# Patient Record
Sex: MALE | Race: OTHER | HISPANIC OR LATINO
[De-identification: names, ages, dates, MRNs, and addresses within clinical notes are randomized per-mention and may not be internally consistent; named-entity substitution may affect disease eponyms.]

---

## 2019-02-25 ENCOUNTER — APPOINTMENT (OUTPATIENT)
Dept: ENDOCRINOLOGY | Facility: CLINIC | Age: 49
End: 2019-02-25
Payer: MEDICAID

## 2019-02-25 VITALS
SYSTOLIC BLOOD PRESSURE: 140 MMHG | DIASTOLIC BLOOD PRESSURE: 80 MMHG | HEIGHT: 65 IN | WEIGHT: 150 LBS | OXYGEN SATURATION: 97 % | HEART RATE: 88 BPM | RESPIRATION RATE: 16 BRPM | BODY MASS INDEX: 24.99 KG/M2

## 2019-02-25 DIAGNOSIS — Z83.49 FAMILY HISTORY OF OTHER ENDOCRINE, NUTRITIONAL AND METABOLIC DISEASES: ICD-10-CM

## 2019-02-25 DIAGNOSIS — I10 ESSENTIAL (PRIMARY) HYPERTENSION: ICD-10-CM

## 2019-02-25 DIAGNOSIS — E04.9 NONTOXIC GOITER, UNSPECIFIED: ICD-10-CM

## 2019-02-25 PROBLEM — Z00.00 ENCOUNTER FOR PREVENTIVE HEALTH EXAMINATION: Status: ACTIVE | Noted: 2019-02-25

## 2019-02-25 PROCEDURE — 99244 OFF/OP CNSLTJ NEW/EST MOD 40: CPT

## 2019-02-25 RX ORDER — AMLODIPINE BESYLATE 10 MG/1
10 TABLET ORAL
Refills: 0 | Status: ACTIVE | COMMUNITY

## 2019-02-25 RX ORDER — FAMOTIDINE 10 MG/1
10 TABLET, FILM COATED ORAL
Refills: 0 | Status: ACTIVE | COMMUNITY

## 2019-02-25 RX ORDER — BENAZEPRIL HYDROCHLORIDE AND HYDROCHLOROTHIAZIDE 10; 12.5 MG/1; MG/1
10-12.5 TABLET, FILM COATED ORAL
Refills: 0 | Status: DISCONTINUED | COMMUNITY
End: 2019-02-25

## 2019-02-25 RX ORDER — BENAZEPRIL HYDROCHLORIDE 20 MG/1
20 TABLET, FILM COATED ORAL DAILY
Qty: 30 | Refills: 5 | Status: ACTIVE | COMMUNITY
Start: 2019-02-25 | End: 1900-01-01

## 2019-02-25 NOTE — HISTORY OF PRESENT ILLNESS
[FreeTextEntry1] : 48 year [place]ib[place]ian-speaking male  referred for hypercalcemia. \par Came with his daughter who helps interpreting.\par Apparently diagnosed with elevated calcium about 2 months ago on several occasion. There are no labs available to me. \par Cancer history: none\par Calcium supplementation: none\par Fracture history: none\par Bone disease risk factors: no history of liver disease, kidney disease, thyroid disease, anticonvulsant use, glucocorticoid use, autoimmune disorders such as rheumatoid arthritis and SLE, COPD, IBD, known malabsorption disorders, or weight loss. \par Family history is negative for  nephrolithiasis, pancreatitis and tumors. Mother was diagnosed with hypercalcemia. \par Currently, no symptoms of polyuria, polydipsia, constipation, abdominal pain, mental confusion, depression, bone pain or fractures,kidney stones. \par Lab:  none\par Radiology: none\par \par

## 2019-02-25 NOTE — CONSULT LETTER
[Dear  ___] : Dear  [unfilled], [Sincerely,] : Sincerely, [DrGraciela  ___] : Dr. JOHNSON [FreeTextEntry1] : Thank you for referring  Mr. FRANCISCO JAVIER CAMPO to me for evaluation and treatment. Please, see attached consultation note. As always, if there are specific questions you would like to discuss, please feel free to contact me.\par Thank you for the courtesy of this evaluation.\par  [FreeTextEntry3] : Austin Yang MD, FACE, ECNU\par

## 2019-02-25 NOTE — ASSESSMENT
[FreeTextEntry1] : - Obtain reports from PCP with prior calcium results.\par - d/c benazepril/HCTZ and switch to benazepril 20mg qd. \par - repeat calcium/PTH, 25D, ionized calcium in about 1 month\par - once D-repleted, will collect urine for 24h calcium\par - thyroid US\par - if indicated , will refer for parathyroid scan\par - hydration\par RTC 5-6 weeks, or sooner prn

## 2019-04-03 ENCOUNTER — APPOINTMENT (OUTPATIENT)
Dept: ENDOCRINOLOGY | Facility: CLINIC | Age: 49
End: 2019-04-03
Payer: MEDICAID

## 2019-04-03 VITALS
HEIGHT: 65 IN | RESPIRATION RATE: 16 BRPM | OXYGEN SATURATION: 97 % | WEIGHT: 150 LBS | BODY MASS INDEX: 24.99 KG/M2 | HEART RATE: 88 BPM | SYSTOLIC BLOOD PRESSURE: 130 MMHG | DIASTOLIC BLOOD PRESSURE: 70 MMHG

## 2019-04-03 PROCEDURE — 99213 OFFICE O/P EST LOW 20 MIN: CPT

## 2019-04-03 NOTE — HISTORY OF PRESENT ILLNESS
[FreeTextEntry1] : 48 year Filipino-speaking male  f/u for hypercalcemia. \par \par off HCTZ. taking benazepril\par repeat ca- 10.3, PTH- 44, 25D- 14\par TSH-  0.91\par did not do thyr US yet\par \par \par HPI:\par Apparently diagnosed with elevated calcium about 2 months ago on several occasion. There are no labs available to me. \par Cancer history: none\par Calcium supplementation: none\par Fracture history: none\par Bone disease risk factors: no history of liver disease, kidney disease, thyroid disease, anticonvulsant use, glucocorticoid use, autoimmune disorders such as rheumatoid arthritis and SLE, COPD, IBD, known malabsorption disorders, or weight loss. \par Family history is negative for  nephrolithiasis, pancreatitis and tumors. Mother was diagnosed with hypercalcemia. \par Currently, no symptoms of polyuria, polydipsia, constipation, abdominal pain, mental confusion, depression, bone pain or fractures,kidney stones. \par Radiology: none\par \par

## 2019-04-03 NOTE — ASSESSMENT
[FreeTextEntry1] : - still obtain reports from PCP with prior calcium results.\par - cont benazepril 20mg qd. Keep off HCTZ if able\par - drisdol 50K qw\par - once D-repleted, will collect urine for 24h calcium\par - thyroid US\par - if indicated , will refer for parathyroid scan\par - hydration\par RTC 3 mos, or sooner prn

## 2019-07-12 ENCOUNTER — APPOINTMENT (OUTPATIENT)
Dept: FAMILY MEDICINE | Facility: CLINIC | Age: 49
End: 2019-07-12
Payer: MEDICAID

## 2019-07-12 VITALS
OXYGEN SATURATION: 97 % | BODY MASS INDEX: 24.99 KG/M2 | RESPIRATION RATE: 16 BRPM | HEART RATE: 77 BPM | SYSTOLIC BLOOD PRESSURE: 124 MMHG | HEIGHT: 65 IN | DIASTOLIC BLOOD PRESSURE: 60 MMHG | WEIGHT: 150 LBS

## 2019-07-12 DIAGNOSIS — I10 ESSENTIAL (PRIMARY) HYPERTENSION: ICD-10-CM

## 2019-07-12 DIAGNOSIS — E83.52 HYPERCALCEMIA: ICD-10-CM

## 2019-07-12 DIAGNOSIS — M54.5 LOW BACK PAIN: ICD-10-CM

## 2019-07-12 DIAGNOSIS — E55.9 VITAMIN D DEFICIENCY, UNSPECIFIED: ICD-10-CM

## 2019-07-12 PROCEDURE — 99203 OFFICE O/P NEW LOW 30 MIN: CPT

## 2019-07-12 RX ORDER — ERGOCALCIFEROL 1.25 MG/1
1.25 MG CAPSULE ORAL
Qty: 13 | Refills: 1 | Status: DISCONTINUED | COMMUNITY
Start: 2019-04-03 | End: 2019-07-12

## 2019-07-12 RX ORDER — VITAMIN K2 90 MCG
125 MCG CAPSULE ORAL
Qty: 60 | Refills: 2 | Status: ACTIVE | COMMUNITY
Start: 2019-07-12 | End: 1900-01-01

## 2019-07-12 NOTE — DATA REVIEWED
[FreeTextEntry1] : Reviewed bw and ua results\par Agreed to reduce vit d to 5000 iu qd from 50k qwk (pt states started urine collection after taking vit d 50k)

## 2019-07-12 NOTE — HISTORY OF PRESENT ILLNESS
[Parent] : parent [FreeTextEntry8] : first time here\par hx of high ca+, had repeat bw and urine collection, wants to review\par also c/o occasional lbp, unable to take nsaids and asa due to angioedema, works in maintenance\par told to have pinched nerve, denies herniated discs\par Beninese speaking

## 2019-07-12 NOTE — PHYSICAL EXAM
[Normal] : normal gait, coordination grossly intact, no focal deficits and deep tendon reflexes were 2+ and symmetric [Normal Affect] : the affect was normal [Alert and Oriented x3] : oriented to person, place, and time [Normal Insight/Judgement] : insight and judgment were intact [No JVD] : no jugular venous distention [Normal Sclera/Conjunctiva] : normal sclera/conjunctiva [Normal Outer Ear/Nose] : the outer ears and nose were normal in appearance [No Respiratory Distress] : no respiratory distress  [No Accessory Muscle Use] : no accessory muscle use [de-identified] : + lumbar tenderness

## 2021-05-12 ENCOUNTER — OUTPATIENT (OUTPATIENT)
Dept: OUTPATIENT SERVICES | Facility: HOSPITAL | Age: 51
LOS: 1 days | End: 2021-05-12
Payer: MEDICAID

## 2021-05-12 VITALS
TEMPERATURE: 99 F | DIASTOLIC BLOOD PRESSURE: 80 MMHG | HEART RATE: 81 BPM | RESPIRATION RATE: 20 BRPM | SYSTOLIC BLOOD PRESSURE: 140 MMHG | HEIGHT: 64.5 IN | WEIGHT: 151.9 LBS | OXYGEN SATURATION: 98 %

## 2021-05-12 DIAGNOSIS — I10 ESSENTIAL (PRIMARY) HYPERTENSION: ICD-10-CM

## 2021-05-12 DIAGNOSIS — G47.33 OBSTRUCTIVE SLEEP APNEA (ADULT) (PEDIATRIC): ICD-10-CM

## 2021-05-12 DIAGNOSIS — D16.5 BENIGN NEOPLASM OF LOWER JAW BONE: ICD-10-CM

## 2021-05-12 DIAGNOSIS — T78.40XS ALLERGY, UNSPECIFIED, SEQUELA: ICD-10-CM

## 2021-05-12 DIAGNOSIS — Z98.890 OTHER SPECIFIED POSTPROCEDURAL STATES: Chronic | ICD-10-CM

## 2021-05-12 LAB
ANION GAP SERPL CALC-SCNC: 12 MMOL/L — SIGNIFICANT CHANGE UP (ref 7–14)
BLD GP AB SCN SERPL QL: NEGATIVE — SIGNIFICANT CHANGE UP
BUN SERPL-MCNC: 14 MG/DL — SIGNIFICANT CHANGE UP (ref 7–23)
CALCIUM SERPL-MCNC: 9.5 MG/DL — SIGNIFICANT CHANGE UP (ref 8.4–10.5)
CHLORIDE SERPL-SCNC: 102 MMOL/L — SIGNIFICANT CHANGE UP (ref 98–107)
CO2 SERPL-SCNC: 25 MMOL/L — SIGNIFICANT CHANGE UP (ref 22–31)
CREAT SERPL-MCNC: 0.83 MG/DL — SIGNIFICANT CHANGE UP (ref 0.5–1.3)
GLUCOSE SERPL-MCNC: 112 MG/DL — HIGH (ref 70–99)
HCT VFR BLD CALC: 44.8 % — SIGNIFICANT CHANGE UP (ref 39–50)
HGB BLD-MCNC: 15.1 G/DL — SIGNIFICANT CHANGE UP (ref 13–17)
MCHC RBC-ENTMCNC: 28.7 PG — SIGNIFICANT CHANGE UP (ref 27–34)
MCHC RBC-ENTMCNC: 33.7 GM/DL — SIGNIFICANT CHANGE UP (ref 32–36)
MCV RBC AUTO: 85 FL — SIGNIFICANT CHANGE UP (ref 80–100)
NRBC # BLD: 0 /100 WBCS — SIGNIFICANT CHANGE UP
NRBC # FLD: 0 K/UL — SIGNIFICANT CHANGE UP
PLATELET # BLD AUTO: 216 K/UL — SIGNIFICANT CHANGE UP (ref 150–400)
POTASSIUM SERPL-MCNC: 4 MMOL/L — SIGNIFICANT CHANGE UP (ref 3.5–5.3)
POTASSIUM SERPL-SCNC: 4 MMOL/L — SIGNIFICANT CHANGE UP (ref 3.5–5.3)
RBC # BLD: 5.27 M/UL — SIGNIFICANT CHANGE UP (ref 4.2–5.8)
RBC # FLD: 13 % — SIGNIFICANT CHANGE UP (ref 10.3–14.5)
RH IG SCN BLD-IMP: POSITIVE — SIGNIFICANT CHANGE UP
SODIUM SERPL-SCNC: 139 MMOL/L — SIGNIFICANT CHANGE UP (ref 135–145)
WBC # BLD: 6.4 K/UL — SIGNIFICANT CHANGE UP (ref 3.8–10.5)
WBC # FLD AUTO: 6.4 K/UL — SIGNIFICANT CHANGE UP (ref 3.8–10.5)

## 2021-05-12 PROCEDURE — 93010 ELECTROCARDIOGRAM REPORT: CPT

## 2021-05-12 RX ORDER — ERGOCALCIFEROL 1.25 MG/1
1 CAPSULE ORAL
Qty: 0 | Refills: 0 | DISCHARGE

## 2021-05-12 RX ORDER — SODIUM CHLORIDE 9 MG/ML
1000 INJECTION, SOLUTION INTRAVENOUS
Refills: 0 | Status: DISCONTINUED | OUTPATIENT
Start: 2021-05-27 | End: 2021-06-11

## 2021-05-12 NOTE — H&P PST ADULT - NEGATIVE ENDOCRINE SYMPTOMS
Luis Thomas is a 13 month old male who was brought in for this visit. History was provided by the caregiver. HPI:   Patient presents with:  Urgent Care F/u: Was seen on 3/5/00452 at [facility] immediate care for diarrhea.      Frequent watery diarrhea started no cold intolerance/no heat intolerance/no voice change

## 2021-05-12 NOTE — H&P PST ADULT - HISTORY OF PRESENT ILLNESS
This is a 51 y.o. male s/p excision of lesion of mandible 2/21 . Pt had repeat Ct of head and neck . Pt has benign neoplasm jaw bone , other and unspecified cysts of jaw . Pt now for surgery.

## 2021-05-12 NOTE — H&P PST ADULT - NSICDXPASTMEDICALHX_GEN_ALL_CORE_FT
PAST MEDICAL HISTORY:  Anxiety and depression     Disc displacement, lumbar 2020;  pain radiates to left leg to knee    Hypertension     Vitamin D deficiency

## 2021-05-12 NOTE — H&P PST ADULT - NSANTHOSAYNRD_GEN_A_CORE
No. MARSHA screening performed.  STOP BANG Legend: 0-2 = LOW Risk; 3-4 = INTERMEDIATE Risk; 5-8 = HIGH Risk

## 2021-05-12 NOTE — H&P PST ADULT - NSICDXPROBLEM_GEN_ALL_CORE_FT
PROBLEM DIAGNOSES  Problem: Benign neoplasm of lower jaw bone  Assessment and Plan: Scheduled for enucleation and curettage mandibular lesion   Preop instructions provided and patient verbalizes understanding.  Medical clearance as per Surgeon   Labs done and results pending.   Hibiclens provided with instructions and was signed by patient. Teach-back method was utilized to assess patient's understanding. Patient verbalized understanding.    Problem: Hypertension  Assessment and Plan: pt instructed totake amlodipine and benazapril day of surgery ,monitor BP during hospital stay     Problem: MARSHA (obstructive sleep apnea)  Assessment and Plan: stop bang positive ; OR faxed     Problem: Allergy, sequela  Assessment and Plan: aspirin, ibuprofin, diclofenac , seafood and pineapple ; OR faxed

## 2021-05-12 NOTE — H&P PST ADULT - REASON FOR ADMISSION
" I am having surgery to remove a small mass of the lower jaw ". Pt is Ukrainian speaking ,  Lamonte #[id number] used as per [facility] recommendation .

## 2021-05-23 DIAGNOSIS — Z01.818 ENCOUNTER FOR OTHER PREPROCEDURAL EXAMINATION: ICD-10-CM

## 2021-05-24 ENCOUNTER — APPOINTMENT (OUTPATIENT)
Dept: DISASTER EMERGENCY | Facility: CLINIC | Age: 51
End: 2021-05-24

## 2021-05-25 LAB — SARS-COV-2 N GENE NPH QL NAA+PROBE: NOT DETECTED

## 2021-05-26 ENCOUNTER — TRANSCRIPTION ENCOUNTER (OUTPATIENT)
Age: 51
End: 2021-05-26

## 2021-05-26 NOTE — ASU PATIENT PROFILE, ADULT - PMH
Anxiety and depression    Disc displacement, lumbar  2020;  pain radiates to left leg to knee  Hypertension    Vitamin D deficiency

## 2021-05-26 NOTE — ASU PATIENT PROFILE, ADULT - NS TRANSFER PATIENT BELONGINGS
Primary Nurse Jaycob Raphael and [address] [facility], RN performed a dual skin assessment on this patient No impairment noted Reji score is 22 Cell Phone/PDA (specify)/Clothing

## 2021-05-27 ENCOUNTER — OUTPATIENT (OUTPATIENT)
Dept: OUTPATIENT SERVICES | Facility: HOSPITAL | Age: 51
LOS: 1 days | Discharge: HOME | End: 2021-05-27
Payer: MEDICAID

## 2021-05-27 VITALS
DIASTOLIC BLOOD PRESSURE: 76 MMHG | RESPIRATION RATE: 14 BRPM | HEIGHT: 64.5 IN | OXYGEN SATURATION: 100 % | SYSTOLIC BLOOD PRESSURE: 146 MMHG | WEIGHT: 151.9 LBS | TEMPERATURE: 98 F | HEART RATE: 70 BPM

## 2021-05-27 VITALS
TEMPERATURE: 98 F | HEART RATE: 89 BPM | RESPIRATION RATE: 16 BRPM | OXYGEN SATURATION: 98 % | SYSTOLIC BLOOD PRESSURE: 126 MMHG | DIASTOLIC BLOOD PRESSURE: 82 MMHG

## 2021-05-27 DIAGNOSIS — D16.5 BENIGN NEOPLASM OF LOWER JAW BONE: ICD-10-CM

## 2021-05-27 DIAGNOSIS — Z98.890 OTHER SPECIFIED POSTPROCEDURAL STATES: Chronic | ICD-10-CM

## 2021-05-27 RX ORDER — FENTANYL CITRATE 50 UG/ML
25 INJECTION INTRAVENOUS
Refills: 0 | Status: DISCONTINUED | OUTPATIENT
Start: 2021-05-27 | End: 2021-05-27

## 2021-05-27 RX ORDER — AMLODIPINE BESYLATE 2.5 MG/1
1 TABLET ORAL
Qty: 0 | Refills: 0 | DISCHARGE

## 2021-05-27 RX ORDER — CHLORHEXIDINE GLUCONATE 213 G/1000ML
15 SOLUTION TOPICAL
Qty: 210 | Refills: 0
Start: 2021-05-27 | End: 2021-06-02

## 2021-05-27 RX ORDER — ONDANSETRON 8 MG/1
4 TABLET, FILM COATED ORAL ONCE
Refills: 0 | Status: DISCONTINUED | OUTPATIENT
Start: 2021-05-27 | End: 2021-05-27

## 2021-05-27 RX ORDER — ERGOCALCIFEROL 1.25 MG/1
0 CAPSULE ORAL
Qty: 0 | Refills: 0 | DISCHARGE

## 2021-05-27 RX ORDER — ACETAMINOPHEN 500 MG
1 TABLET ORAL
Qty: 28 | Refills: 0
Start: 2021-05-27 | End: 2021-06-02

## 2021-05-27 RX ORDER — HYDROMORPHONE HYDROCHLORIDE 2 MG/ML
0.5 INJECTION INTRAMUSCULAR; INTRAVENOUS; SUBCUTANEOUS
Refills: 0 | Status: DISCONTINUED | OUTPATIENT
Start: 2021-05-27 | End: 2021-05-27

## 2021-05-27 RX ORDER — ONDANSETRON 8 MG/1
4 TABLET, FILM COATED ORAL ONCE
Refills: 0 | Status: DISCONTINUED | OUTPATIENT
Start: 2021-05-27 | End: 2021-06-11

## 2021-05-27 RX ORDER — OXYCODONE HYDROCHLORIDE 5 MG/1
1 TABLET ORAL
Qty: 18 | Refills: 0
Start: 2021-05-27 | End: 2021-05-29

## 2021-05-27 RX ORDER — BENAZEPRIL HYDROCHLORIDE 40 MG/1
1 TABLET ORAL
Qty: 0 | Refills: 0 | DISCHARGE

## 2021-05-27 RX ORDER — AMOXICILLIN 250 MG/5ML
1 SUSPENSION, RECONSTITUTED, ORAL (ML) ORAL
Qty: 21 | Refills: 0
Start: 2021-05-27 | End: 2021-06-02

## 2021-05-27 NOTE — H&P ADULT - NSHPREVIEWOFSYSTEMS_GEN_ALL_CORE
Review of Systems:   · Negative General Symptoms	no fever; no chills; no fatigue  · Negative Skin Symptoms	no rash; no itching  · Skin Symptoms	dryness  · Negative Ophthalmologic Symptoms	no diplopia; no blurred vision L; no blurred vision R  · Negative ENMT Symptoms	no hearing difficulty; no ear pain; no nose bleeds; no gum bleeding; no throat pain; no dysphagia  · ENMT Comments	small mass of lower jaw  · Negative Respiratory and Thorax Symptoms	no wheezing; no dyspnea; no cough  · Negative Cardiovascular Symptoms	no chest pain; no palpitations; no dyspnea on exertion; no peripheral edema; no claudication  · Negative Gastrointestinal Symptoms	no nausea; no vomiting; no diarrhea; no constipation; no melena  · Gastrointestinal Comments	bm 1 q d , last this am  · Negative General Genitourinary Symptoms	no hematuria; no dysuria  · Negative Musculoskeletal Symptoms	no neck pain  · Musculoskeletal Symptoms	back pain  · Location of Back Pain	lumbar spine; radiates to left leg to thigh  · Negative Neurological Symptoms	no weakness; no generalized seizures; no focal seizures; no headache  · Psychiatric Symptoms	depression; anxiety; doing "well " in pst  · Negative Hematology Symptoms	no gum bleeding; no nose bleeding; no skin lumps  · Negative Lymphatic Symptoms	no enlarged lymph nodes; no tender lymph nodes; no swelling of extremity  · Negative Endocrine Symptoms	no cold intolerance; no heat intolerance; no voice change  · Negative Allergy Types	no indoor environmental allergies  · Allergy Types	outdoor environmental allergies; pollen - allergic rhinitis

## 2021-05-27 NOTE — H&P ADULT - NSHPSOCIALHISTORY_GEN_ALL_CORE
Social History:  · Marital Status	  · Occupation	Maintenance  · Lives With	spouse     Substance Use History:  · Substance Use Comment	denies illegal drug     Alcohol Use History:  · Have you ever consumed alcohol	unknown  · Alcohol Use Comment	stopped 15 years ago , denies any abuse of alcohol     Tobacco Usage:  · Tobacco Usage: Never smoker

## 2021-05-27 NOTE — ASU DISCHARGE PLAN (ADULT/PEDIATRIC) - MEDICATION INSTRUCTIONS
You received intravenous tylenol in the operating room at 3:40pm. You may take additional tylenol products after 9:40pm.

## 2021-05-27 NOTE — H&P ADULT - ASSESSMENT
51 y.o. male s/p biopsy of lesion of mandible 2/21 . Pt had repeat Ct of head and neck . Pt has benign neoplasm jaw bone , other and unspecified cysts of jaw . Pt presents now for enucleation and curettage of mandibular cyst with Dr. Bill in OR under [facility] on 5/27/2021.

## 2021-05-27 NOTE — H&P ADULT - ATTENDING COMMENTS
Pt to OR for enucleation and curettage of left mandibular lesion.  Frozen section.  Possible placement of 5- Fluorouracil depending on pathology.  RBA d/w pt.

## 2021-05-27 NOTE — H&P ADULT - NSHPPHYSICALEXAM_GEN_ALL_CORE
Physical Exam:  · Constitutional		well-groomed; no distress  · Eyes		PERRL; EOMI; conjunctiva clear  · ENMT		mouth; pharynx  · Mouth	dry; left side under tongue small lesion  · Neck	supple; no JVD  · Breasts	not examined  · Back		normal shape; ROM intact  · Respiratory		breath sounds equal; good air movement; respirations non-labored; clear to auscultation bilaterally; no rales; no rhonchi; no wheezes  · Cardiovascular		regular rate and rhythm  no rub  no murmur  · Cardiovascular Details	positive S1; positive S2  · Gastrointestinal		soft; nontender; no distention; no masses palpable; bowel sounds normal; no organomegaly  · Genitourinary	not examined  · Rectal	not examined  · Extremities		no clubbing; no cyanosis; no pedal edema  · Vascular	detailed exam  · DP Pulse	right normal; left normal  · PT Pulse	right normal; left normal  · Neurological		alert and oriented x 3  · Skin		warm and dry; color normal  · Lymph Nodes		posterior cervical L; posterior cervical R; anterior cervical L; anterior cervical R  · Posterior Cervical L	normal  · Posterior Cervical R	normal  · Anterior Cervical L	normal  · Anterior Cervical R	normal  · Musculoskeletal		ROM intact; no joint swelling; no joint erythema; no joint warmth; no calf tenderness; normal strength  · Psychiatric		normal affect; normal behavior Physical Exam:  · Constitutional		well-groomed; no distress  · Eyes		PERRL; EOMI; conjunctiva clear  · ENMT		mouth; pharynx  · Mouth	dry  · Neck	supple; no JVD  · Breasts	not examined  · Back		normal shape; ROM intact  · Respiratory		breath sounds equal; good air movement; respirations non-labored; clear to auscultation bilaterally; no rales; no rhonchi; no wheezes  · Cardiovascular		regular rate and rhythm  no rub  no murmur  · Cardiovascular Details	positive S1; positive S2  · Gastrointestinal		soft; nontender; no distention; no masses palpable; bowel sounds normal; no organomegaly  · Genitourinary	not examined  · Rectal	not examined  · Extremities		no clubbing; no cyanosis; no pedal edema  · Vascular	detailed exam  · DP Pulse	right normal; left normal  · PT Pulse	right normal; left normal  · Neurological		alert and oriented x 3  · Skin		warm and dry; color normal  · Lymph Nodes		posterior cervical L; posterior cervical R; anterior cervical L; anterior cervical R  · Posterior Cervical L	normal  · Posterior Cervical R	normal  · Anterior Cervical L	normal  · Anterior Cervical R	normal  · Musculoskeletal		ROM intact; no joint swelling; no joint erythema; no joint warmth; no calf tenderness; normal strength  · Psychiatric		normal affect; normal behavior

## 2021-05-27 NOTE — ASU DISCHARGE PLAN (ADULT/PEDIATRIC) - CALL YOUR DOCTOR IF YOU HAVE ANY OF THE FOLLOWING:
Bleeding that does not stop/Swelling that gets worse/Wound/Surgical Site with redness, or foul smelling discharge or pus Bleeding that does not stop/Swelling that gets worse/Pain not relieved by Medications/Fever greater than (need to indicate Fahrenheit or Celsius)/Wound/Surgical Site with redness, or foul smelling discharge or pus/Nausea and vomiting that does not stop/Unable to urinate

## 2021-05-27 NOTE — ASU DISCHARGE PLAN (ADULT/PEDIATRIC) - CARE PROVIDER_API CALL
Chris Bill (DDS; MD)  [facility]  [phone] [place] [address]  Phone: [phone]  Fax: [phone]  Follow Up Time:

## 2021-05-28 ENCOUNTER — RESULT REVIEW (OUTPATIENT)
Age: 51
End: 2021-05-28

## 2021-05-28 PROCEDURE — 88331 PATH CONSLTJ SURG 1 BLK 1SPC: CPT | Mod: 26

## 2021-05-28 PROCEDURE — 88305 TISSUE EXAM BY PATHOLOGIST: CPT | Mod: 26

## 2023-08-03 NOTE — H&P PST ADULT - PATIENT ON (OXYGEN DELIVERY METHOD)
room air Albendazole Counseling:  I discussed with the patient the risks of albendazole including but not limited to cytopenia, kidney damage, nausea/vomiting and severe allergy.  The patient understands that this medication is being used in an off-label manner.

## 2023-09-18 NOTE — H&P PST ADULT - ALLERGY TYPES
Byron Patel is calling to request a refill on the following medication(s):    Medication Request:  Requested Prescriptions     Pending Prescriptions Disp Refills    BD PEN NEEDLE ESVIN 2ND GEN 32G X 4 MM MISC 100 each 2     Si each by Other route in the morning and at bedtime       Last Visit Date (If Applicable):  Visit date not found    Next Visit Date:    2023
Pt needs the needles for her insulin   BD ESVIN 4mm by 32 gauge is what the box says   Send to [employer] by [employer]
pollen - allergic rhinitis/outdoor environmental allergies